# Patient Record
Sex: FEMALE | Employment: PART TIME | URBAN - NONMETROPOLITAN AREA
[De-identification: names, ages, dates, MRNs, and addresses within clinical notes are randomized per-mention and may not be internally consistent; named-entity substitution may affect disease eponyms.]

---

## 2024-09-28 ENCOUNTER — APPOINTMENT (OUTPATIENT)
Dept: ULTRASOUND IMAGING | Age: 34
End: 2024-09-28
Payer: COMMERCIAL

## 2024-09-28 ENCOUNTER — HOSPITAL ENCOUNTER (EMERGENCY)
Age: 34
Discharge: HOME | End: 2024-09-28
Payer: COMMERCIAL

## 2024-09-28 VITALS
DIASTOLIC BLOOD PRESSURE: 76 MMHG | RESPIRATION RATE: 16 BRPM | SYSTOLIC BLOOD PRESSURE: 102 MMHG | TEMPERATURE: 98.7 F | HEIGHT: 67 IN | BODY MASS INDEX: 20.4 KG/M2 | OXYGEN SATURATION: 99 % | HEART RATE: 77 BPM | WEIGHT: 130 LBS

## 2024-09-28 DIAGNOSIS — O20.9 VAGINAL BLEEDING BEFORE 22 WEEKS GESTATION: Primary | ICD-10-CM

## 2024-09-28 LAB
ANION GAP SERPL CALC-SCNC: 14 MEQ/L (ref 8–16)
BACTERIA URNS QL MICRO: ABNORMAL /HPF
BASOPHILS ABSOLUTE: 0 THOU/MM3 (ref 0–0.1)
BASOPHILS NFR BLD AUTO: 0.4 %
BILIRUB UR QL STRIP.AUTO: NEGATIVE
BUN SERPL-MCNC: 9 MG/DL (ref 7–22)
CALCIUM SERPL-MCNC: 8.5 MG/DL (ref 8.5–10.5)
CASTS #/AREA URNS LPF: ABNORMAL /LPF
CASTS 2: ABNORMAL /LPF
CHARACTER UR: ABNORMAL
CHLORIDE SERPL-SCNC: 104 MEQ/L (ref 98–111)
CO2 SERPL-SCNC: 19 MEQ/L (ref 23–33)
COLOR, UA: YELLOW
CREAT SERPL-MCNC: 0.5 MG/DL (ref 0.4–1.2)
CRYSTALS URNS MICRO: ABNORMAL
DEPRECATED RDW RBC AUTO: 40.1 FL (ref 35–45)
EOSINOPHIL NFR BLD AUTO: 1.1 %
EOSINOPHILS ABSOLUTE: 0.1 THOU/MM3 (ref 0–0.4)
EPITHELIAL CELLS, UA: ABNORMAL /HPF
ERYTHROCYTE [DISTWIDTH] IN BLOOD BY AUTOMATED COUNT: 12.2 % (ref 11.5–14.5)
GFR SERPL CREATININE-BSD FRML MDRD: > 90 ML/MIN/1.73M2
GLUCOSE SERPL-MCNC: 90 MG/DL (ref 70–108)
GLUCOSE UR QL STRIP.AUTO: NEGATIVE MG/DL
HCG INTACT+B SERPL-ACNC: ABNORMAL MIU/ML (ref 0–5)
HCT VFR BLD AUTO: 37.4 % (ref 37–47)
HGB BLD-MCNC: 12.8 GM/DL (ref 12–16)
HGB UR QL STRIP.AUTO: ABNORMAL
IMM GRANULOCYTES # BLD AUTO: 0.03 THOU/MM3 (ref 0–0.07)
IMM GRANULOCYTES NFR BLD AUTO: 0.4 %
KETONES UR QL STRIP.AUTO: NEGATIVE
LYMPHOCYTES ABSOLUTE: 2.3 THOU/MM3 (ref 1–4.8)
LYMPHOCYTES NFR BLD AUTO: 28.3 %
MCH RBC QN AUTO: 31.1 PG (ref 26–33)
MCHC RBC AUTO-ENTMCNC: 34.2 GM/DL (ref 32.2–35.5)
MCV RBC AUTO: 91 FL (ref 81–99)
MISCELLANEOUS 2: ABNORMAL
MONOCYTES ABSOLUTE: 0.5 THOU/MM3 (ref 0.4–1.3)
MONOCYTES NFR BLD AUTO: 6.4 %
NEUTROPHILS ABSOLUTE: 5.1 THOU/MM3 (ref 1.8–7.7)
NEUTROPHILS NFR BLD AUTO: 63.4 %
NITRITE UR QL STRIP: NEGATIVE
NRBC BLD AUTO-RTO: 0 /100 WBC
OSMOLALITY SERPL CALC.SUM OF ELEC: 272 MOSMOL/KG (ref 275–300)
PH UR STRIP.AUTO: 8.5 [PH] (ref 5–9)
PLATELET # BLD AUTO: 205 THOU/MM3 (ref 130–400)
PMV BLD AUTO: 9.7 FL (ref 9.4–12.4)
POTASSIUM SERPL-SCNC: 3.6 MEQ/L (ref 3.5–5.2)
PROT UR STRIP.AUTO-MCNC: NEGATIVE MG/DL
RBC # BLD AUTO: 4.11 MILL/MM3 (ref 4.2–5.4)
RBC URINE: ABNORMAL /HPF
RENAL EPI CELLS #/AREA URNS HPF: ABNORMAL /[HPF]
SODIUM SERPL-SCNC: 137 MEQ/L (ref 135–145)
SP GR UR REFRACT.AUTO: 1.02 (ref 1–1.03)
UROBILINOGEN, URINE: 1 EU/DL (ref 0–1)
WBC # BLD AUTO: 8 THOU/MM3 (ref 4.8–10.8)
WBC #/AREA URNS HPF: ABNORMAL /HPF
WBC #/AREA URNS HPF: NEGATIVE /[HPF]
YEAST LIKE FUNGI URNS QL MICRO: ABNORMAL

## 2024-09-28 PROCEDURE — 76815 OB US LIMITED FETUS(S): CPT

## 2024-09-28 PROCEDURE — 80048 BASIC METABOLIC PNL TOTAL CA: CPT

## 2024-09-28 PROCEDURE — 81001 URINALYSIS AUTO W/SCOPE: CPT

## 2024-09-28 PROCEDURE — 85025 COMPLETE CBC W/AUTO DIFF WBC: CPT

## 2024-09-28 PROCEDURE — 36415 COLL VENOUS BLD VENIPUNCTURE: CPT

## 2024-09-28 PROCEDURE — 84702 CHORIONIC GONADOTROPIN TEST: CPT

## 2024-09-28 PROCEDURE — 99284 EMERGENCY DEPT VISIT MOD MDM: CPT

## 2024-09-28 ASSESSMENT — PAIN - FUNCTIONAL ASSESSMENT
PAIN_FUNCTIONAL_ASSESSMENT: 0-10
PAIN_FUNCTIONAL_ASSESSMENT: NONE - DENIES PAIN
PAIN_FUNCTIONAL_ASSESSMENT: NONE - DENIES PAIN

## 2024-09-28 ASSESSMENT — PAIN DESCRIPTION - DESCRIPTORS: DESCRIPTORS: CRAMPING

## 2024-09-28 ASSESSMENT — PAIN DESCRIPTION - ORIENTATION: ORIENTATION: LOWER

## 2024-09-28 ASSESSMENT — PAIN DESCRIPTION - LOCATION: LOCATION: ABDOMEN

## 2024-09-28 ASSESSMENT — PAIN SCALES - GENERAL: PAINLEVEL_OUTOF10: 2

## 2024-09-29 NOTE — ED TRIAGE NOTES
Pt presents to the ED via walk-in from home with c/o vaginal bleeding that started early afternoon and was scant spotting with brown looking blood and then as the day went on blood got brighter and about an hour ago blood became more with some possible clots present pt states. Pt states she has mild cramping in lower abdomen rated 2/10 intermittently. Pt is 15 weeks pregnant, pt states at 8 weeks pregnant she had bleeding and was diagnosed with 2 bleeds in uterus, pt states \"I am hoping it is something to do with that\".  Pt VSS and A&O x4.

## 2024-09-29 NOTE — ED NOTES
Pt is sitting up in bed, family at bedside, provider at bedside talking with pt. Pt is breathing regular and unlabored on room air. Pt has lower abdominal cramping pt states \"it's not bad\", no other signs of distress to note at this time. Call light within reach.

## 2024-09-29 NOTE — ED PROVIDER NOTES
[facility] EMERGENCY DEPT      [facility] Emergency Department Encounter    Pt Name: Jade Zuñiga  MRN: [id number]  Birthdate 1990  Date of evaluation: 9/28/2024    PA: Bentley SANCHES-EM      CHIEF COMPLAINT    Chief Complaint   Patient presents with    Vaginal Bleeding             HISTORY OF PRESENT ILLNESS       Jade Zuñiga is a 33 y.o. female who presents to the emergency dept  with vaginal bleeding.  Patient is G2, P1 ; ZGSD=4781 Last pregnancy was normal with no complications. She has a 2 year old daughter at home.  At approximately 7 weeks ago she started having vaginal spotting without cramping and she was diagnosed with a subchorionic hemorrhage at that point.  She stated she had some light bleeding at that time and did not use any pads and the bleeding resolved shortly after. The course of her pregnancy has otherwise been uneventful.  Today she started having low abdominal cramping similar to menstrual cramps in her low abdomen that comes and goes.  She describes it as a crampy nature.  She states bleeding started again and this time it is more reddish-brown.  She did wear a pad and changed once but felt there was a lot of bleeding this morning when she went to the restroom and that alarmed her to come to the ED.  She denies have any other vaginal symptoms.  She denies recent intercourse that may have triggered symptoms.   Other than the subchorionic hemorrhage she has had a normal pregnancy.  She is having normal first trimester nausea which resolves easily and typical in pregnancy.  She denies any lightheadedness or dizziness.  No chest pain or shortness of breath.  She does have an appointment with her OB Dr. Nuñez On Tuesday. She has no other concerns at this time.         PAST MEDICAL HISTORY   has no past medical history on

## 2024-09-29 NOTE — ED NOTES
Provider did pelvic exam with this RN at bedside. Pt laying down in bed with mom at bedside. Pt is breathing regular and unlabored on room air. Pt has no signs of distress to note at this time. Call light within reach.

## 2024-09-29 NOTE — ED NOTES
Pt is sitting up in bed, mom at bedside asks for water. Pt is breathing regular and unlabored on room air. Pt has no signs of distress to note at this time. Call light within reach.

## 2025-03-02 ENCOUNTER — ANESTHESIA (OUTPATIENT)
Dept: LABOR AND DELIVERY | Age: 35
End: 2025-03-02
Payer: COMMERCIAL

## 2025-03-02 ENCOUNTER — ANESTHESIA EVENT (OUTPATIENT)
Dept: LABOR AND DELIVERY | Age: 35
End: 2025-03-02
Payer: COMMERCIAL

## 2025-03-02 ENCOUNTER — HOSPITAL ENCOUNTER (INPATIENT)
Age: 35
LOS: 2 days | Discharge: HOME | End: 2025-03-04
Attending: OBSTETRICS & GYNECOLOGY | Admitting: OBSTETRICS & GYNECOLOGY
Payer: COMMERCIAL

## 2025-03-02 PROBLEM — O47.9 UTERINE CONTRACTIONS DURING PREGNANCY: Status: ACTIVE | Noted: 2025-03-02

## 2025-03-02 LAB
ABO GROUP BLD: NORMAL
ALBUMIN SERPL BCG-MCNC: 3.6 G/DL (ref 3.4–4.9)
ALP SERPL-CCNC: 145 U/L (ref 35–104)
ALT SERPL W/O P-5'-P-CCNC: 9 U/L (ref 10–35)
AMPHETAMINES UR QL SCN: NEGATIVE
ANION GAP SERPL CALC-SCNC: 21 MEQ/L (ref 8–16)
APTT PPP: 24.8 SECONDS (ref 22–38)
AST SERPL-CCNC: 25 U/L (ref 10–35)
BACTERIA URNS QL MICRO: ABNORMAL /HPF
BARBITURATES UR QL SCN: NEGATIVE
BENZODIAZ UR QL SCN: NEGATIVE
BILIRUB SERPL-MCNC: 0.6 MG/DL (ref 0.3–1.2)
BILIRUB UR QL STRIP.AUTO: ABNORMAL
BUN SERPL-MCNC: 21 MG/DL (ref 8–23)
BZE UR QL SCN: NEGATIVE
CALCIUM SERPL-MCNC: 9.2 MG/DL (ref 8.6–10)
CANNABINOIDS UR QL SCN: NEGATIVE
CASTS #/AREA URNS LPF: ABNORMAL /LPF
CASTS 2: ABNORMAL /LPF
CHARACTER UR: ABNORMAL
CHLORIDE SERPL-SCNC: 99 MEQ/L (ref 98–111)
CO2 SERPL-SCNC: 17 MEQ/L (ref 22–29)
COLOR, UA: ABNORMAL
CREAT SERPL-MCNC: 0.6 MG/DL (ref 0.5–0.9)
CRYSTALS URNS MICRO: ABNORMAL
DEPRECATED RDW RBC AUTO: 44.6 FL (ref 35–45)
EPITHELIAL CELLS, UA: ABNORMAL /HPF
ERYTHROCYTE [DISTWIDTH] IN BLOOD BY AUTOMATED COUNT: 13.3 % (ref 11.5–14.5)
FENTANYL: NEGATIVE
FIBRINOGEN PPP-MCNC: 402 MG/100ML (ref 155–475)
GFR SERPL CREATININE-BSD FRML MDRD: > 90 ML/MIN/1.73M2
GLUCOSE SERPL-MCNC: 110 MG/DL (ref 74–109)
GLUCOSE UR QL STRIP.AUTO: NEGATIVE MG/DL
HCT VFR BLD AUTO: 37.9 % (ref 37–47)
HGB BLD-MCNC: 12.7 GM/DL (ref 12–16)
HGB UR QL STRIP.AUTO: NEGATIVE
IAT IGG-SP REAG SERPL QL: NORMAL
INR PPP: 0.97 (ref 0.85–1.13)
KETONES UR QL STRIP.AUTO: >= 160
MCH RBC QN AUTO: 30.8 PG (ref 26–33)
MCHC RBC AUTO-ENTMCNC: 33.5 GM/DL (ref 32.2–35.5)
MCV RBC AUTO: 92 FL (ref 81–99)
MISCELLANEOUS 2: ABNORMAL
NITRITE UR QL STRIP: NEGATIVE
OPIATES UR QL SCN: NEGATIVE
OXYCODONE: NEGATIVE
PCP UR QL SCN: NEGATIVE
PH UR STRIP.AUTO: 5.5 [PH] (ref 5–9)
PLATELET # BLD AUTO: 195 THOU/MM3 (ref 130–400)
PMV BLD AUTO: 10.5 FL (ref 9.4–12.4)
POTASSIUM SERPL-SCNC: 3.2 MEQ/L (ref 3.5–5.2)
PROT SERPL-MCNC: 6.9 G/DL (ref 6.4–8.3)
PROT UR STRIP.AUTO-MCNC: 30 MG/DL
RBC # BLD AUTO: 4.12 MILL/MM3 (ref 4.2–5.4)
RBC URINE: ABNORMAL /HPF
RENAL EPI CELLS #/AREA URNS HPF: ABNORMAL /[HPF]
RH BLD: NORMAL
SODIUM SERPL-SCNC: 137 MEQ/L (ref 135–145)
SP GR UR REFRACT.AUTO: > 1.03 (ref 1–1.03)
UROBILINOGEN, URINE: 1 EU/DL (ref 0–1)
WBC # BLD AUTO: 10.2 THOU/MM3 (ref 4.8–10.8)
WBC #/AREA URNS HPF: ABNORMAL /HPF
WBC #/AREA URNS HPF: NEGATIVE /[HPF]
YEAST LIKE FUNGI URNS QL MICRO: ABNORMAL

## 2025-03-02 PROCEDURE — 2500000003 HC RX 250 WO HCPCS: Performed by: OBSTETRICS & GYNECOLOGY

## 2025-03-02 PROCEDURE — 81001 URINALYSIS AUTO W/SCOPE: CPT

## 2025-03-02 PROCEDURE — 80307 DRUG TEST PRSMV CHEM ANLYZR: CPT

## 2025-03-02 PROCEDURE — 2580000003 HC RX 258: Performed by: OBSTETRICS & GYNECOLOGY

## 2025-03-02 PROCEDURE — 85384 FIBRINOGEN ACTIVITY: CPT

## 2025-03-02 PROCEDURE — 86901 BLOOD TYPING SEROLOGIC RH(D): CPT

## 2025-03-02 PROCEDURE — 86885 COOMBS TEST INDIRECT QUAL: CPT

## 2025-03-02 PROCEDURE — 85730 THROMBOPLASTIN TIME PARTIAL: CPT

## 2025-03-02 PROCEDURE — 86900 BLOOD TYPING SEROLOGIC ABO: CPT

## 2025-03-02 PROCEDURE — 3700000025 EPIDURAL BLOCK: Performed by: ANESTHESIOLOGY

## 2025-03-02 PROCEDURE — 87086 URINE CULTURE/COLONY COUNT: CPT

## 2025-03-02 PROCEDURE — 6370000000 HC RX 637 (ALT 250 FOR IP): Performed by: OBSTETRICS & GYNECOLOGY

## 2025-03-02 PROCEDURE — 85610 PROTHROMBIN TIME: CPT

## 2025-03-02 PROCEDURE — 86592 SYPHILIS TEST NON-TREP QUAL: CPT

## 2025-03-02 PROCEDURE — 36415 COLL VENOUS BLD VENIPUNCTURE: CPT

## 2025-03-02 PROCEDURE — 80053 COMPREHEN METABOLIC PANEL: CPT

## 2025-03-02 PROCEDURE — 2500000003 HC RX 250 WO HCPCS: Performed by: ANESTHESIOLOGY

## 2025-03-02 PROCEDURE — 1220000001 HC SEMI PRIVATE L&D R&B

## 2025-03-02 PROCEDURE — 85027 COMPLETE CBC AUTOMATED: CPT

## 2025-03-02 PROCEDURE — 6360000002 HC RX W HCPCS: Performed by: OBSTETRICS & GYNECOLOGY

## 2025-03-02 RX ORDER — LIDOCAINE HYDROCHLORIDE 10 MG/ML
30 INJECTION, SOLUTION EPIDURAL; INFILTRATION; INTRACAUDAL; PERINEURAL PRN
Status: DISCONTINUED | OUTPATIENT
Start: 2025-03-02 | End: 2025-03-03

## 2025-03-02 RX ORDER — SODIUM CHLORIDE 0.9 % (FLUSH) 0.9 %
5-40 SYRINGE (ML) INJECTION PRN
Status: DISCONTINUED | OUTPATIENT
Start: 2025-03-02 | End: 2025-03-03

## 2025-03-02 RX ORDER — TRANEXAMIC ACID 10 MG/ML
1000 INJECTION, SOLUTION INTRAVENOUS
Status: DISCONTINUED | OUTPATIENT
Start: 2025-03-02 | End: 2025-03-03

## 2025-03-02 RX ORDER — ONDANSETRON 4 MG/1
4 TABLET, ORALLY DISINTEGRATING ORAL EVERY 8 HOURS PRN
Status: DISCONTINUED | OUTPATIENT
Start: 2025-03-02 | End: 2025-03-02 | Stop reason: SDUPTHER

## 2025-03-02 RX ORDER — SODIUM CHLORIDE, SODIUM LACTATE, POTASSIUM CHLORIDE, CALCIUM CHLORIDE 600; 310; 30; 20 MG/100ML; MG/100ML; MG/100ML; MG/100ML
INJECTION, SOLUTION INTRAVENOUS CONTINUOUS
Status: DISCONTINUED | OUTPATIENT
Start: 2025-03-02 | End: 2025-03-02 | Stop reason: SDUPTHER

## 2025-03-02 RX ORDER — CARBOPROST TROMETHAMINE 250 UG/ML
250 INJECTION, SOLUTION INTRAMUSCULAR PRN
Status: DISCONTINUED | OUTPATIENT
Start: 2025-03-02 | End: 2025-03-03

## 2025-03-02 RX ORDER — EPHEDRINE SULFATE 5 MG/ML
10 INJECTION INTRAVENOUS
Status: COMPLETED | OUTPATIENT
Start: 2025-03-02 | End: 2025-03-03

## 2025-03-02 RX ORDER — ONDANSETRON 2 MG/ML
4 INJECTION INTRAMUSCULAR; INTRAVENOUS EVERY 6 HOURS PRN
Status: DISCONTINUED | OUTPATIENT
Start: 2025-03-02 | End: 2025-03-03

## 2025-03-02 RX ORDER — ACETAMINOPHEN 500 MG
1000 TABLET ORAL EVERY 8 HOURS SCHEDULED
Status: DISCONTINUED | OUTPATIENT
Start: 2025-03-02 | End: 2025-03-03

## 2025-03-02 RX ORDER — POTASSIUM CHLORIDE 1500 MG/1
40 TABLET, EXTENDED RELEASE ORAL PRN
Status: DISCONTINUED | OUTPATIENT
Start: 2025-03-02 | End: 2025-03-03

## 2025-03-02 RX ORDER — SODIUM CHLORIDE 0.9 % (FLUSH) 0.9 %
5-40 SYRINGE (ML) INJECTION EVERY 12 HOURS SCHEDULED
Status: DISCONTINUED | OUTPATIENT
Start: 2025-03-02 | End: 2025-03-03

## 2025-03-02 RX ORDER — SODIUM CHLORIDE, SODIUM LACTATE, POTASSIUM CHLORIDE, CALCIUM CHLORIDE 600; 310; 30; 20 MG/100ML; MG/100ML; MG/100ML; MG/100ML
INJECTION, SOLUTION INTRAVENOUS CONTINUOUS
Status: DISCONTINUED | OUTPATIENT
Start: 2025-03-02 | End: 2025-03-03

## 2025-03-02 RX ORDER — ONDANSETRON 4 MG/1
4 TABLET, ORALLY DISINTEGRATING ORAL EVERY 6 HOURS PRN
Status: DISCONTINUED | OUTPATIENT
Start: 2025-03-02 | End: 2025-03-03

## 2025-03-02 RX ORDER — OXYTOCIN/0.9 % SODIUM CHLORIDE 30/500 ML
87.3 PLASTIC BAG, INJECTION (ML) INTRAVENOUS CONTINUOUS PRN
Status: DISCONTINUED | OUTPATIENT
Start: 2025-03-02 | End: 2025-03-03

## 2025-03-02 RX ORDER — FENTANYL CITRATE 50 UG/ML
100 INJECTION, SOLUTION INTRAMUSCULAR; INTRAVENOUS
Status: DISCONTINUED | OUTPATIENT
Start: 2025-03-02 | End: 2025-03-03 | Stop reason: HOSPADM

## 2025-03-02 RX ORDER — OXYTOCIN/0.9 % SODIUM CHLORIDE 30/500 ML
1 PLASTIC BAG, INJECTION (ML) INTRAVENOUS CONTINUOUS
Status: DISCONTINUED | OUTPATIENT
Start: 2025-03-02 | End: 2025-03-03

## 2025-03-02 RX ORDER — ONDANSETRON 2 MG/ML
4 INJECTION INTRAMUSCULAR; INTRAVENOUS EVERY 6 HOURS PRN
Status: DISCONTINUED | OUTPATIENT
Start: 2025-03-02 | End: 2025-03-03 | Stop reason: HOSPADM

## 2025-03-02 RX ORDER — SODIUM CHLORIDE, SODIUM LACTATE, POTASSIUM CHLORIDE, AND CALCIUM CHLORIDE .6; .31; .03; .02 G/100ML; G/100ML; G/100ML; G/100ML
1000 INJECTION, SOLUTION INTRAVENOUS ONCE
Status: COMPLETED | OUTPATIENT
Start: 2025-03-02 | End: 2025-03-02

## 2025-03-02 RX ORDER — METHYLERGONOVINE MALEATE 0.2 MG/ML
200 INJECTION INTRAVENOUS PRN
Status: DISCONTINUED | OUTPATIENT
Start: 2025-03-02 | End: 2025-03-03

## 2025-03-02 RX ORDER — MISOPROSTOL 200 UG/1
400 TABLET ORAL PRN
Status: DISCONTINUED | OUTPATIENT
Start: 2025-03-02 | End: 2025-03-03

## 2025-03-02 RX ORDER — ONDANSETRON 2 MG/ML
4 INJECTION INTRAMUSCULAR; INTRAVENOUS EVERY 6 HOURS PRN
Status: DISCONTINUED | OUTPATIENT
Start: 2025-03-02 | End: 2025-03-02 | Stop reason: SDUPTHER

## 2025-03-02 RX ORDER — NALOXONE HYDROCHLORIDE 0.4 MG/ML
INJECTION, SOLUTION INTRAMUSCULAR; INTRAVENOUS; SUBCUTANEOUS PRN
Status: DISCONTINUED | OUTPATIENT
Start: 2025-03-02 | End: 2025-03-03 | Stop reason: HOSPADM

## 2025-03-02 RX ORDER — SODIUM CHLORIDE, SODIUM LACTATE, POTASSIUM CHLORIDE, AND CALCIUM CHLORIDE .6; .31; .03; .02 G/100ML; G/100ML; G/100ML; G/100ML
500 INJECTION, SOLUTION INTRAVENOUS PRN
Status: DISCONTINUED | OUTPATIENT
Start: 2025-03-02 | End: 2025-03-03

## 2025-03-02 RX ORDER — POTASSIUM CHLORIDE 7.45 MG/ML
10 INJECTION INTRAVENOUS PRN
Status: DISCONTINUED | OUTPATIENT
Start: 2025-03-02 | End: 2025-03-03

## 2025-03-02 RX ORDER — SODIUM CHLORIDE 9 MG/ML
25 INJECTION, SOLUTION INTRAVENOUS PRN
Status: DISCONTINUED | OUTPATIENT
Start: 2025-03-02 | End: 2025-03-03

## 2025-03-02 RX ORDER — EPHEDRINE SULFATE 5 MG/ML
5 INJECTION INTRAVENOUS PRN
Status: COMPLETED | OUTPATIENT
Start: 2025-03-03 | End: 2025-03-03

## 2025-03-02 RX ORDER — TERBUTALINE SULFATE 1 MG/ML
0.25 INJECTION SUBCUTANEOUS
Status: DISCONTINUED | OUTPATIENT
Start: 2025-03-02 | End: 2025-03-03

## 2025-03-02 RX ORDER — ACETAMINOPHEN 500 MG
1000 TABLET ORAL EVERY 8 HOURS SCHEDULED
Status: DISCONTINUED | OUTPATIENT
Start: 2025-03-02 | End: 2025-03-02

## 2025-03-02 RX ORDER — DOCUSATE SODIUM 100 MG/1
100 CAPSULE, LIQUID FILLED ORAL 2 TIMES DAILY
Status: DISCONTINUED | OUTPATIENT
Start: 2025-03-02 | End: 2025-03-03 | Stop reason: HOSPADM

## 2025-03-02 RX ADMIN — Medication 18 ML/HR: at 23:07

## 2025-03-02 RX ADMIN — SODIUM CHLORIDE, SODIUM LACTATE, POTASSIUM CHLORIDE, AND CALCIUM CHLORIDE: .6; .31; .03; .02 INJECTION, SOLUTION INTRAVENOUS at 17:58

## 2025-03-02 RX ADMIN — Medication 1 MILLI-UNITS/MIN: at 22:34

## 2025-03-02 RX ADMIN — SODIUM CHLORIDE, SODIUM LACTATE, POTASSIUM CHLORIDE, AND CALCIUM CHLORIDE: .6; .31; .03; .02 INJECTION, SOLUTION INTRAVENOUS at 23:37

## 2025-03-02 RX ADMIN — ONDANSETRON 4 MG: 4 TABLET, ORALLY DISINTEGRATING ORAL at 16:57

## 2025-03-02 RX ADMIN — ONDANSETRON 4 MG: 2 INJECTION, SOLUTION INTRAMUSCULAR; INTRAVENOUS at 16:58

## 2025-03-02 RX ADMIN — POTASSIUM CHLORIDE 40 MEQ: 1500 TABLET, EXTENDED RELEASE ORAL at 20:58

## 2025-03-02 RX ADMIN — ACETAMINOPHEN 1000 MG: 500 TABLET ORAL at 20:24

## 2025-03-02 RX ADMIN — FAMOTIDINE 20 MG: 10 INJECTION, SOLUTION INTRAVENOUS at 20:05

## 2025-03-02 RX ADMIN — SODIUM CHLORIDE, SODIUM LACTATE, POTASSIUM CHLORIDE, AND CALCIUM CHLORIDE 1000 ML: .6; .31; .03; .02 INJECTION, SOLUTION INTRAVENOUS at 16:44

## 2025-03-02 ASSESSMENT — PAIN SCALES - GENERAL: PAINLEVEL_OUTOF10: 0

## 2025-03-02 NOTE — FLOWSHEET NOTE
Dr. Samm Blood at the nurses station and updated on patient status. Patient feeling better, baby is tachycardic. RN will continue with poc, labs reviewed. Still waiting on CMP to result.

## 2025-03-02 NOTE — FLOWSHEET NOTE
Patient arrived to the unit via wheelchair from the ED with her  with c/o ctx that she thinks is due to vomiting for the last 24 hours. Patient states she can't even keep water down. Patient is having positive fetal movement, no leakage of fluid, no bleeding. Patient oriented to the room, gown provided, call light within reach.

## 2025-03-02 NOTE — FLOWSHEET NOTE
Dr. DAYDAY Nuñez updated on patient status. Patient blood pressures remain stable, patient is dumping urine. Patient has no c/o headache, nausea or vomiting. Order received for RN to discontinue magnesium and observe for 1 hour before transfer to mom/baby.

## 2025-03-03 LAB — RPR SER QL: NONREACTIVE

## 2025-03-03 PROCEDURE — 1200000000 HC SEMI PRIVATE

## 2025-03-03 PROCEDURE — 7200000001 HC VAGINAL DELIVERY

## 2025-03-03 PROCEDURE — 6370000000 HC RX 637 (ALT 250 FOR IP): Performed by: OBSTETRICS & GYNECOLOGY

## 2025-03-03 PROCEDURE — 10907ZC DRAINAGE OF AMNIOTIC FLUID, THERAPEUTIC FROM PRODUCTS OF CONCEPTION, VIA NATURAL OR ARTIFICIAL OPENING: ICD-10-PCS | Performed by: OBSTETRICS & GYNECOLOGY

## 2025-03-03 PROCEDURE — 2500000003 HC RX 250 WO HCPCS: Performed by: ANESTHESIOLOGY

## 2025-03-03 PROCEDURE — 6360000002 HC RX W HCPCS: Performed by: OBSTETRICS & GYNECOLOGY

## 2025-03-03 PROCEDURE — 2580000003 HC RX 258: Performed by: OBSTETRICS & GYNECOLOGY

## 2025-03-03 RX ORDER — METHYLERGONOVINE MALEATE 0.2 MG/ML
200 INJECTION INTRAVENOUS PRN
Status: DISCONTINUED | OUTPATIENT
Start: 2025-03-03 | End: 2025-03-04 | Stop reason: HOSPADM

## 2025-03-03 RX ORDER — FAMOTIDINE 20 MG/1
20 TABLET, FILM COATED ORAL 2 TIMES DAILY PRN
Status: DISCONTINUED | OUTPATIENT
Start: 2025-03-03 | End: 2025-03-04 | Stop reason: HOSPADM

## 2025-03-03 RX ORDER — SODIUM CHLORIDE 0.9 % (FLUSH) 0.9 %
5-40 SYRINGE (ML) INJECTION EVERY 12 HOURS SCHEDULED
Status: DISCONTINUED | OUTPATIENT
Start: 2025-03-03 | End: 2025-03-04 | Stop reason: HOSPADM

## 2025-03-03 RX ORDER — ACETAMINOPHEN 500 MG
1000 TABLET ORAL EVERY 8 HOURS SCHEDULED
Status: DISCONTINUED | OUTPATIENT
Start: 2025-03-03 | End: 2025-03-04 | Stop reason: HOSPADM

## 2025-03-03 RX ORDER — SODIUM CHLORIDE 0.9 % (FLUSH) 0.9 %
5-40 SYRINGE (ML) INJECTION PRN
Status: DISCONTINUED | OUTPATIENT
Start: 2025-03-03 | End: 2025-03-04 | Stop reason: HOSPADM

## 2025-03-03 RX ORDER — DOCUSATE SODIUM 100 MG/1
100 CAPSULE, LIQUID FILLED ORAL 2 TIMES DAILY PRN
Status: DISCONTINUED | OUTPATIENT
Start: 2025-03-03 | End: 2025-03-04 | Stop reason: HOSPADM

## 2025-03-03 RX ORDER — SODIUM CHLORIDE, SODIUM LACTATE, POTASSIUM CHLORIDE, CALCIUM CHLORIDE 600; 310; 30; 20 MG/100ML; MG/100ML; MG/100ML; MG/100ML
INJECTION, SOLUTION INTRAVENOUS CONTINUOUS
Status: DISCONTINUED | OUTPATIENT
Start: 2025-03-03 | End: 2025-03-04 | Stop reason: HOSPADM

## 2025-03-03 RX ORDER — IBUPROFEN 800 MG/1
800 TABLET, FILM COATED ORAL EVERY 8 HOURS
Status: DISCONTINUED | OUTPATIENT
Start: 2025-03-03 | End: 2025-03-04 | Stop reason: HOSPADM

## 2025-03-03 RX ORDER — OXYCODONE HYDROCHLORIDE 5 MG/1
5 TABLET ORAL EVERY 4 HOURS PRN
Status: DISCONTINUED | OUTPATIENT
Start: 2025-03-03 | End: 2025-03-04 | Stop reason: HOSPADM

## 2025-03-03 RX ORDER — MISOPROSTOL 200 UG/1
TABLET ORAL
Status: DISCONTINUED
Start: 2025-03-03 | End: 2025-03-03 | Stop reason: WASHOUT

## 2025-03-03 RX ORDER — ONDANSETRON 4 MG/1
4 TABLET, ORALLY DISINTEGRATING ORAL EVERY 6 HOURS PRN
Status: DISCONTINUED | OUTPATIENT
Start: 2025-03-03 | End: 2025-03-04 | Stop reason: HOSPADM

## 2025-03-03 RX ORDER — OXYCODONE HYDROCHLORIDE 5 MG/1
10 TABLET ORAL EVERY 4 HOURS PRN
Status: DISCONTINUED | OUTPATIENT
Start: 2025-03-03 | End: 2025-03-04 | Stop reason: HOSPADM

## 2025-03-03 RX ORDER — ONDANSETRON 2 MG/ML
4 INJECTION INTRAMUSCULAR; INTRAVENOUS EVERY 6 HOURS PRN
Status: DISCONTINUED | OUTPATIENT
Start: 2025-03-03 | End: 2025-03-04 | Stop reason: HOSPADM

## 2025-03-03 RX ORDER — MODIFIED LANOLIN
OINTMENT (GRAM) TOPICAL PRN
Status: DISCONTINUED | OUTPATIENT
Start: 2025-03-03 | End: 2025-03-04 | Stop reason: HOSPADM

## 2025-03-03 RX ORDER — FERROUS SULFATE 325(65) MG
325 TABLET ORAL
Status: DISCONTINUED | OUTPATIENT
Start: 2025-03-03 | End: 2025-03-04 | Stop reason: HOSPADM

## 2025-03-03 RX ORDER — MISOPROSTOL 200 UG/1
1000 TABLET ORAL PRN
Status: DISCONTINUED | OUTPATIENT
Start: 2025-03-03 | End: 2025-03-04 | Stop reason: HOSPADM

## 2025-03-03 RX ORDER — IBUPROFEN 800 MG/1
800 TABLET, FILM COATED ORAL ONCE
Status: COMPLETED | OUTPATIENT
Start: 2025-03-03 | End: 2025-03-03

## 2025-03-03 RX ORDER — SODIUM CHLORIDE 9 MG/ML
INJECTION, SOLUTION INTRAVENOUS PRN
Status: DISCONTINUED | OUTPATIENT
Start: 2025-03-03 | End: 2025-03-04 | Stop reason: HOSPADM

## 2025-03-03 RX ORDER — CARBOPROST TROMETHAMINE 250 UG/ML
250 INJECTION, SOLUTION INTRAMUSCULAR PRN
Status: DISCONTINUED | OUTPATIENT
Start: 2025-03-03 | End: 2025-03-04 | Stop reason: HOSPADM

## 2025-03-03 RX ADMIN — SODIUM CHLORIDE, SODIUM LACTATE, POTASSIUM CHLORIDE, AND CALCIUM CHLORIDE: .6; .31; .03; .02 INJECTION, SOLUTION INTRAVENOUS at 01:11

## 2025-03-03 RX ADMIN — Medication 166.7 ML: at 02:56

## 2025-03-03 RX ADMIN — Medication: at 05:02

## 2025-03-03 RX ADMIN — IBUPROFEN 800 MG: 800 TABLET, FILM COATED ORAL at 04:01

## 2025-03-03 RX ADMIN — ACETAMINOPHEN 1000 MG: 500 TABLET ORAL at 20:18

## 2025-03-03 RX ADMIN — DOCUSATE SODIUM 100 MG: 100 CAPSULE, LIQUID FILLED ORAL at 09:04

## 2025-03-03 RX ADMIN — EPHEDRINE SULFATE 10 MG: 5 INJECTION INTRAVENOUS at 00:27

## 2025-03-03 RX ADMIN — METHYLERGONOVINE MALEATE 200 MCG: 0.2 INJECTION, SOLUTION INTRAMUSCULAR; INTRAVENOUS at 03:00

## 2025-03-03 RX ADMIN — IBUPROFEN 800 MG: 800 TABLET, FILM COATED ORAL at 14:54

## 2025-03-03 RX ADMIN — EPHEDRINE SULFATE 5 MG: 5 INJECTION INTRAVENOUS at 00:04

## 2025-03-03 RX ADMIN — EPHEDRINE SULFATE 5 MG: 5 INJECTION INTRAVENOUS at 01:11

## 2025-03-03 ASSESSMENT — PAIN DESCRIPTION - LOCATION
LOCATION: ABDOMEN
LOCATION: ABDOMEN
LOCATION: PERINEUM

## 2025-03-03 ASSESSMENT — PAIN DESCRIPTION - DESCRIPTORS
DESCRIPTORS: CRAMPING
DESCRIPTORS: CRAMPING
DESCRIPTORS: CRAMPING;DISCOMFORT

## 2025-03-03 ASSESSMENT — PAIN SCALES - GENERAL
PAINLEVEL_OUTOF10: 4
PAINLEVEL_OUTOF10: 3
PAINLEVEL_OUTOF10: 2

## 2025-03-03 ASSESSMENT — PAIN - FUNCTIONAL ASSESSMENT: PAIN_FUNCTIONAL_ASSESSMENT: ACTIVITIES ARE NOT PREVENTED

## 2025-03-03 ASSESSMENT — PAIN DESCRIPTION - ORIENTATION
ORIENTATION: LOWER
ORIENTATION: MID;LOWER

## 2025-03-03 NOTE — LACTATION NOTE
Met with pt in room.  Gave booklet and local support information, verified they have a breast pump. Shared basics about breastfeeding, frequency, pumping tips, answered questions. Pt bf first baby for 21 months.  Offered assistance with latch as baby was very fussy and reluctant to feed.  Spoon fed drops and tired a nipple shield.  Upon observation possible short lingual frenulum noted. Explained to patient signs and symptoms of improper milk transfer. Discussed proper tongue movement and proper comfort of latch. Educated that IBCLC can not diagnose a short lingual frenulum and provided patient with physician handout for further evaluation. Name and number on board for calling out for assistance.  Offered support prn, reviewed [employer] and other area entities.

## 2025-03-03 NOTE — ANESTHESIA PROCEDURE NOTES
Epidural Block    Patient location during procedure: OB  Start time: 3/2/2025 10:57 PM  End time: 3/2/2025 11:06 PM  Reason for block: labor epidural  Staffing  Performed: resident/CRNA   Anesthesiologist: Yeyo Patino DO  Resident/CRNA: Thanh Kuhn APRN - CRNA  Performed by: Thanh Kuhn APRN - CRNA  Authorized by: Yeyo Patino DO    Epidural  Patient position: sitting  Prep: ChloraPrep  Patient monitoring: continuous pulse ox and frequent blood pressure checks  Approach: midline  Location: L3-4  Injection technique: XOCHITL saline  Provider prep: sterile gloves and mask  Needle  Needle type: Tuohy   Needle gauge: 18 G  Needle length: 3.5 in  Needle insertion depth: 5 cm  Catheter type: side hole  Catheter size: 19 G  Catheter at skin depth: 11 cm  Test dose: negativeCatheter Secured: tegaderm and tape  Assessment  Hemodynamics: stable  Attempts: 1  Outcomes: uncomplicated  Preanesthetic Checklist  Completed: patient identified, IV checked, site marked, risks and benefits discussed, surgical/procedural consents, equipment checked, pre-op evaluation, timeout performed, anesthesia consent given, oxygen available, monitors applied/VS acknowledged, fire risk safety assessment completed and verbalized and blood product R/B/A discussed and consented

## 2025-03-03 NOTE — FLOWSHEET NOTE
Up to the bathroom voided quantity sufficient. Pericare per pt. Dermaplast and tucks used, clean pad put in place. Gown changed. Pt to wheelchair and transferred to [facility] in stable condition with new born daughter in arms. Report to SHELBY Brumfield RN

## 2025-03-03 NOTE — L&D DELIVERY NOTE
Department of Obstetrics and Gynecology  Spontaneous Vaginal Delivery Note      Pt Name: Jade Patel  MRN: [id number] Acct #: [id number]  YOB: 1990  Procedure Performed By: Marva Nuñez MD, MD      Pre-operative Diagnosis:  Term pregnancy, Induced labor, and vaginal bleeding prior to  induction c/w marginal abruption  Post-operative Diagnosis: Same, delivered.  Procedure:  Spontaneous vaginal delivery  Surgeon:  Marva Nuñez    Information for the patient's :  Jorge, Addie Hansen [[id number]]        Anesthesia:  epidural anesthesia  Estimated blood loss:  300ml  Specimen:  Placenta not sent to pathology   Complications:  none  Condition:  infant stable to general nursery and mother stable    Details of Procedure:   The patient is a 34 y.o. female at 38w0d   OB History          2    Para   1    Term   1            AB        Living   1         SAB        IAB        Ectopic        Molar        Multiple        Live Births   1             who was admitted for vaginal bleeding. She received the following interventions: ARBOW and IV Pitocin augmentation.  The patient progressed well,did receive an epidural, became complete and started to push. She was placed in the dorsal lithotomy position and prepped. She delivered the vertex over an intact perineum .  The rest of the infant delivered atraumatically, placed on mother abdomen. The nurse attended the baby. The cord was clamped and cut after a minute. The baby stayed with mom and skin to skin was implemented. Routine cord blood were obtained. The placenta delivered intact, whole and that the umbilical cord had three vessels noted. The perineum and vagina were explored and a no lacerations were found . Pt had a gush of bleeding that responded with massage and methergine was given .  A vaginal sweep was performed and there were no retained products and 1 needles were taken off the field. The count was

## 2025-03-03 NOTE — ANESTHESIA POSTPROCEDURE EVALUATION
Department of Anesthesiology  Postprocedure Note    Patient: Jade Patel  MRN: [id number]  YOB: 1990  Date of evaluation: 3/3/2025    Procedure Summary       Date: 03/02/25 Room / Location:     Anesthesia Start: 2257 Anesthesia Stop: 03/03/25 0248    Procedure: Labor Analgesia Diagnosis:     Scheduled Providers:  Responsible Provider: Yeyo Patino DO    Anesthesia Type: epidural ASA Status: 2            Anesthesia Type: No value filed.    Ceci Phase I: Ceci Score: 9    Ceci Phase II: Ceci Score: 10    Anesthesia Post Evaluation    Patient location during evaluation: floor  Patient participation: complete - patient participated  Level of consciousness: awake and alert  Airway patency: patent  Nausea & Vomiting: no nausea and no vomiting  Cardiovascular status: hemodynamically stable  Respiratory status: acceptable and spontaneous ventilation  Hydration status: euvolemic  Pain management: satisfactory to patient        No notable events documented.

## 2025-03-03 NOTE — ANESTHESIA PRE PROCEDURE
Results   Component Value Date    LABANTI NEG 03/02/2025       Drug/Infectious Status (If Applicable):  No results found for: \"HIV\", \"HEPCAB\"    COVID-19 Screening (If Applicable): No results found for: \"COVID19\"        Anesthesia Evaluation  Patient summary reviewed  Airway: Mallampati: III  TM distance: >3 FB   Neck ROM: full  Mouth opening: > = 3 FB   Dental:          Pulmonary:                              Cardiovascular:                      Neuro/Psych:               GI/Hepatic/Renal:             Endo/Other:                     Abdominal:   (+) obese          Vascular:          Other Findings:             Anesthesia Plan      epidural     ASA 2             Anesthetic plan and risks discussed with patient.      Plan discussed with attending.                    GABY Song - CRNA   3/2/2025

## 2025-03-03 NOTE — FLOWSHEET NOTE
Patient is sitting upright in bed holding baby and bonding well. Mother is calm and cooperative with visitors present. VS WDL and pt reports no pain at this time. Pt expressed trouble latching, lactation consulted earlier today. Fundus is firm and U/-1. Scant amount of rubra lochia noted on eli-pad. No signs of lacerations, swelling, or infection noted. Skin is pink, warm, and dry. No edema present. Homans sign negative. Mother is voiding without difficulty but has not passed gas yet. All questions answered. Call light and bedside table within reach.     SN Estella ONU

## 2025-03-03 NOTE — FLOWSHEET NOTE
Bedside report received from Veena Combs RN. Patient sitting up for epidural, coping well with contractions. Thanh Kuhn CRNA at bedside.

## 2025-03-03 NOTE — FLOWSHEET NOTE
Dr. Ruelas at bedside to discuss POC. Will proceed with induction at this time due to continues bleeding a fetal heart tone decelerations. Patient and FOB are agreeable to POC and voice no further questions or concerns at this time.

## 2025-03-03 NOTE — FLOWSHEET NOTE
Dr. Ruelas consulted with Dr. Nuñez and is discussing POC with patient at this time. Plan will be to monitor patient for continued bleeding and fetal distress. If patient continues to bleed or if induction is indicated then patient is okay with proceeding with induction. Possible partial placenta abruption discussed with patient in length. She understands associated risks and potential events including a potential delivery per  section if indicated. Patient understands and agrees with potential risks. No further questions or concerns at this time.

## 2025-03-03 NOTE — FLOWSHEET NOTE
Up to BR with assist to void large amount, Pericare instructions given, voices understanding, Fundus firm , midline, U/-2 after voiding, small amount of bleeding noted.

## 2025-03-03 NOTE — H&P
[facility]  History and Physical Update    Pt Name: Jade Patel  MRN: [id number]  YOB: 1990  Date of evaluation: 3/2/2025    [] I have examined the patient and reviewed the H&P/Consult and there are no changes to the patient or plans.    [x] I have examined the patient and reviewed the H&P/Consult and have noted the following changes:   Appreciate Dr Quijano's care.  Agree with assessment of possible marginal abruption.   Fhts now cat 1 after being cat 2 for tachycardia  D/w Jade Venegas and Maribell  Will augment with pitocin.      Discussion with the patient and/ or family for proposed care, treatment, services; benefits, risks, side effects; likelihood of achieving goals and potential problems that may occur during recuperation was had and all questions were answered.  Discussion with the patient and/ or family of reasonable alternatives to the proposed care, treatment, services and the discussion of the risks, benefits, side effects related to the alternatives and the risk related to not receiving the proposed care treatment services was also had and all questions were answered.    If this is for an elective surgical procedure then The patient was counseled at length about the risks of susy Covid-19 during their perioperative period and any recovery window from their procedure.  The patient was made aware that susy Covid-19  may worsen their prognosis for recovering from their procedure  and lend to a higher morbidity and/or mortality risk.  All material risks, benefits, and reasonable alternatives including postponing the procedure were discussed. The patient  does wish to proceed with the procedure at this time.             Marva Nuñez MD,MD  Electronically signed 3/2/2025 at 8:34 PM

## 2025-03-03 NOTE — L&D DELIVERY NOTE
Addie Patel [[id number]]      Labor Events     Labor: No   Steroids: None  Cervical Ripening Date/Time:      Antibiotics Received during Labor: No  Rupture Date/Time:  3/2/25 20:16:00   Rupture Type: AROM  Fluid Color: Bloody Show  Fluid Odor: None  Fluid Volume: Scant  Induction: None  Augmentation: AROM, Oxytocin       Anesthesia    Method: Epidural       Labor Event Times      Labor onset date/time:        Dilation complete date/time:  3/3/25 01:30:00 EST     Start pushing date/time:     Decision date/time (emergent ):            Delivery Details      Delivery Date: 3/3/25 Delivery Time: 02:48:00                 Cord    Vessels: 3 Vessels  Complications: None  Delayed Cord Clamping?: Yes  Cord Clamped Date/Time: 3/3/2025 02:52:00  Cord Blood Disposition: Lab  Gases Sent?: No              Placenta           Lacerations           Vaginal Counts    Initial Count Personnel: LIAM ARZOLA RN  Initial Count Verified By: 16 INSTRUMENTS,5 LAPS,1 SHARP  Intial Sponge Count: Correct Intial Needles Count: Correct Intial Instruments Count: Correct          Blood Loss  Mother: Jade Patel #[id number]     Start of Mother's Information      Delivery Blood Loss   Intrapartum & Postpartum: 25 1448 - 25 0309    Delivery Admission: 25 1448 - 25 0309         Intrapartum & Postpartum Delivery Admission    Quantitative Blood Loss (mL) Hospital Encounter 17 grams 17 grams    Total  17 mL 17 mL               End of Mother's Information  Mother: Jade Patel #[id number]                Delivery Providers    Delivering clinician:      Provider Role     Obstetrician     Primary Nurse     Primary  Nurse     NICU Nurse     Neonatologist     Anesthesiologist     Nurse Anesthetist     Nurse Practitioner     Midwife     Nursery Nurse     Respiratory Therapist     Scrub Tech     Assistant Surgeon              [place] Assessment    Living Status: Living        Skin Color:

## 2025-03-03 NOTE — FLOWSHEET NOTE
Dr. Samm Blood notified patient had some vaginal bleeding and has been asked to come and evaluate.

## 2025-03-03 NOTE — FLOWSHEET NOTE
Dr. DAYDAY Nuñez returned page. Updated on patient's contraction pattern and she has viewed strip. Dr. Nuñez states it is okay to initiate pitocin at this time. No further questions or concerns. Will continue to work towards vaginal delivery.

## 2025-03-03 NOTE — H&P
Department of Obstetrics and Gynecology   Obstetrics History and Physical        CHIEF COMPLAINT:  vaginal bleeding    HISTORY OF PRESENT ILLNESS:      The patient is a 34 y.o. female at 37w6d.  She has been here approx 4.5 hours this afternoon for likely viral gastroenteritis; however during her stay she started having mod-large amount vaginal maroon bleeding c/w marginal abruption.  Subtle late decels x 2 noted at 1940 and 1949.  We plan to keep her and induce her labor now that FHT are up to 170 and bleeding continues.    OB History          2    Para   1    Term   1            AB        Living   1         SAB        IAB        Ectopic        Molar        Multiple        Live Births   1            Patient presents with a chief complaint as above and is being admitted for induction    Estimated Due Date: Estimated Date of Delivery: 3/17/25    PRENATAL CARE:    Complicated by: none    PAST OB HISTORY:  OB History          2    Para   1    Term   1            AB        Living   1         SAB        IAB        Ectopic        Molar        Multiple        Live Births   1                Past Medical History:    History reviewed. No pertinent past medical history.  Past Surgical History:    History reviewed. No pertinent surgical history.  Allergies:  Patient has no known allergies.    Social History:    Social History     Socioeconomic History    Marital status:      Spouse name: Not on file    Number of children: Not on file    Years of education: Not on file    Highest education level: Not on file   Occupational History    Not on file   Tobacco Use    Smoking status: Never    Smokeless tobacco: Never   Substance and Sexual Activity    Alcohol use: Not Currently    Drug use: Never    Sexual activity: Not on file   Other Topics Concern    Not on file   Social History Narrative    Not on file     Social Determinants of Health     Financial Resource Strain: Not on file   Food Insecurity:

## 2025-03-03 NOTE — FLOWSHEET NOTE
Thanh CAVAZOS at bedside to place epidural. Consent form signed. No further questions or concerns at this time.

## 2025-03-03 NOTE — FLOWSHEET NOTE
Report received from Denice HAMPTON. Pt brought to 5a18 via wheelchair with infant in arms. Pt oriented to unit and room. Pt was fully assessed and stable. Pt has no further questions at this time.

## 2025-03-04 VITALS
HEIGHT: 67 IN | DIASTOLIC BLOOD PRESSURE: 74 MMHG | BODY MASS INDEX: 24.33 KG/M2 | HEART RATE: 87 BPM | WEIGHT: 155 LBS | SYSTOLIC BLOOD PRESSURE: 110 MMHG | RESPIRATION RATE: 16 BRPM | TEMPERATURE: 97.6 F | OXYGEN SATURATION: 97 %

## 2025-03-04 LAB
BACTERIA UR CULT: ABNORMAL
ORGANISM: ABNORMAL

## 2025-03-04 PROCEDURE — 6370000000 HC RX 637 (ALT 250 FOR IP): Performed by: OBSTETRICS & GYNECOLOGY

## 2025-03-04 RX ORDER — IBUPROFEN 800 MG/1
800 TABLET, FILM COATED ORAL EVERY 8 HOURS
Qty: 120 TABLET | Refills: 3 | Status: SHIPPED | OUTPATIENT
Start: 2025-03-04

## 2025-03-04 RX ADMIN — IBUPROFEN 800 MG: 800 TABLET, FILM COATED ORAL at 10:27

## 2025-03-04 RX ADMIN — DOCUSATE SODIUM 100 MG: 100 CAPSULE, LIQUID FILLED ORAL at 07:54

## 2025-03-04 ASSESSMENT — PAIN DESCRIPTION - DESCRIPTORS: DESCRIPTORS: CRAMPING

## 2025-03-04 ASSESSMENT — PAIN - FUNCTIONAL ASSESSMENT: PAIN_FUNCTIONAL_ASSESSMENT: ACTIVITIES ARE NOT PREVENTED

## 2025-03-04 ASSESSMENT — PAIN DESCRIPTION - LOCATION: LOCATION: ABDOMEN

## 2025-03-04 ASSESSMENT — PAIN DESCRIPTION - ORIENTATION: ORIENTATION: LOWER

## 2025-03-04 ASSESSMENT — PAIN SCALES - GENERAL: PAINLEVEL_OUTOF10: 2

## 2025-03-04 NOTE — DISCHARGE SUMMARY
Obstetric Discharge Summary      Pt Name: Jade Patel  MRN: [id number] Acct #: [id number]  YOB: 1990        Admitting Diagnosis  IUP  OB History          2    Para   2    Term   2            AB        Living   2         SAB        IAB        Ectopic        Molar        Multiple   0    Live Births   2                Reasons for Admission on 3/2/2025  3:39 PM  Uterine contractions during pregnancy [O47.9]  Vaginal Delivery      Intrapartum Procedures  None              Postpartum/Operative Complications  None       [place] Data  Information for the patient's :  Foster, Girl Jade [[id number]]   female   Birth Weight: 3.19 kg (7 lb 0.5 oz)    Discharge Diagnosis       Discharge Information  Current Discharge Medication List        START taking these medications    Details   ibuprofen (ADVIL;MOTRIN) 800 MG tablet Take 1 tablet by mouth every 8 (eight) hours  Qty: 120 tablet, Refills: 3           CONTINUE these medications which have NOT CHANGED    Details   Prenatal Multivit-Min-Fe-FA (PRE- FORMULA PO) Take by mouth             Vaginal Delivery  Diet regular  Condition Good    Discharge to:  home  Discharge instructions placed  Follow up in 5-6 wks with OBGYN

## 2025-03-04 NOTE — DISCHARGE INSTRUCTIONS
bleeding in the urine    Cough, shortness of breath, or chest pain    Depression, suicidal thoughts, or feelings of harming your baby    Breasts that are hot, red and accompanied by fever    Any cracking or bleeding from the nipple or areola (the dark-colored area of the breast)      In case of an emergency, call 911 immediately.     Dequan Vitale DO 3/4/2025 7:13 AM    
3 (mild pain)

## 2025-03-04 NOTE — PLAN OF CARE
Problem: Pain  Goal: Verbalizes/displays adequate comfort level or baseline comfort level  Outcome: Progressing  Flowsheets (Taken 3/2/2025 2041)  Verbalizes/displays adequate comfort level or baseline comfort level:   Encourage patient to monitor pain and request assistance   Assess pain using appropriate pain scale   Implement non-pharmacological measures as appropriate and evaluate response     Problem: Vaginal Birth or  Section  Goal: Fetal and maternal status remain reassuring during the birth process  Description:  Birth OB-Pregnancy care plan goal which identifies if the fetal and maternal status remain reassuring during the birth process  Outcome: Progressing  Flowsheets (Taken 3/2/2025 2041)  Fetal and Maternal Status Remain Reassuring During the Birth Process:   Monitor vital signs   Monitor uterine activity   Monitor labor progression (Vaginal delivery)   Monitor fetal heart rate     Problem: Postpartum  Goal: Experiences normal postpartum course  Description:  Postpartum OB-Pregnancy care plan goal which identifies if the mother is experiencing a normal postpartum course  Outcome: Progressing  Flowsheets (Taken 3/2/2025 2041)  Experiences Normal Postpartum Course:   Monitor maternal vital signs   Assess uterine involution     Problem: Infection - Adult  Goal: Absence of infection at discharge  Outcome: Progressing  Flowsheets (Taken 3/2/2025 2041)  Absence of infection at discharge:   Assess and monitor for signs and symptoms of infection   Monitor lab/diagnostic results   Monitor endotracheal (as able) and nasal secretions for changes in amount and color   Administer medications as ordered     Problem: Safety - Adult  Goal: Free from fall injury  Outcome: Progressing  Flowsheets (Taken 3/2/2025 2041)  Free From Fall Injury: Instruct family/caregiver on patient safety     Problem: Discharge Planning  Goal: Discharge to home or other facility with appropriate resources  Outcome: 
  Problem: Postpartum  Goal: Experiences normal postpartum course  Description:  Postpartum OB-Pregnancy care plan goal which identifies if the mother is experiencing a normal postpartum course  3/3/2025 1540 by Jaylene Combs RN  Outcome: Progressing  Flowsheets (Taken 3/3/2025 09)  Experiences Normal Postpartum Course:   Monitor maternal vital signs   Assess uterine involution  Note: Patient is having a normal postpartum course.     Problem: Postpartum  Goal: Appropriate maternal -  bonding  Description:  Postpartum OB-Pregnancy care plan goal which identifies if the mother and  are bonding appropriately  3/3/2025 1540 by Jaylene Combs, RN  Outcome: Progressing  Note: Bonding with baby, participating in infant care.      Problem: Postpartum  Goal: Establishment of infant feeding pattern  Description:  Postpartum OB-Pregnancy care plan goal which identifies if the mother is establishing a feeding pattern with their   3/3/2025 1540 by Jaylene Combs RN  Outcome: Progressing  Flowsheets (Taken 3/3/2025 0900)  Establishment of Infant Feeding Pattern:   Assess breast/bottle feeding   Refer to lactation as needed  Note: Infant is having a normal feeding pattern.     Problem: Postpartum  Goal: Incisions, wounds, or drain sites healing without S/S of infection  3/3/2025 1540 by Jaylene Combs RN  Outcome: Progressing  Note: Patient shows no sign/symptoms of infection.     Problem: Pain  Goal: Verbalizes/displays adequate comfort level or baseline comfort level  3/3/2025 1540 by Jaylene Combs RN  Outcome: Progressing  Flowsheets (Taken 3/3/2025 0900)  Verbalizes/displays adequate comfort level or baseline comfort level:   Encourage patient to monitor pain and request assistance   Assess pain using appropriate pain scale   Administer analgesics based on type and severity of pain and evaluate response   Implement non-pharmacological measures as appropriate and evaluate 
MEMO Ferrer  Absence of infection at discharge: Assess and monitor for signs and symptoms of infection  Taken 3/2/2025 2041 by Veena Combs RN  Absence of infection at discharge:   Assess and monitor for signs and symptoms of infection   Monitor lab/diagnostic results   Monitor endotracheal (as able) and nasal secretions for changes in amount and color   Administer medications as ordered  Note: Vital signs and assessments WNL.'     Problem: Safety - Adult  Goal: Free from fall injury  Outcome: Progressing  Flowsheets  Taken 3/3/2025 0520 by Carissa Brumfield RN  Free From Fall Injury: Instruct family/caregiver on patient safety  Taken 3/2/2025 2041 by Veena Combs RN  Free From Fall Injury: Instruct family/caregiver on patient safety  Note: No falls     Problem: Discharge Planning  Goal: Discharge to home or other facility with appropriate resources  Outcome: Progressing  Flowsheets  Taken 3/3/2025 0520 by Carissa Brumfield RN  Discharge to home or other facility with appropriate resources: Identify barriers to discharge with patient and caregiver  Taken 3/2/2025 2041 by Veena Combs RN  Discharge to home or other facility with appropriate resources:   Identify barriers to discharge with patient and caregiver   Arrange for needed discharge resources and transportation as appropriate  Note: Discharge plans discussed with pt      Care plan reviewed with patient and she contributes to goal setting and voices understanding of plan of care.    
and symptoms of infection     Problem: Safety - Adult  Goal: Free from fall injury  3/3/2025 2300 by Steffany Graham, RN  Outcome: Progressing  Flowsheets (Taken 3/3/2025 2001)  Free From Fall Injury: Instruct family/caregiver on patient safety     Problem: Discharge Planning  Goal: Discharge to home or other facility with appropriate resources  3/3/2025 2300 by Stfefany Graham, RN  Outcome: Progressing  Flowsheets (Taken 3/3/2025 2001)  Discharge to home or other facility with appropriate resources: Identify barriers to discharge with patient and caregiver   Plan of care discussed with mother and she contributes to goal setting and voices understanding of plan of care.   
family/caregiver on patient safety     Problem: Discharge Planning  Goal: Discharge to home or other facility with appropriate resources  3/3/2025 0021 by Bernadette Fall, RN  Outcome: Progressing  Flowsheets (Taken 3/2/2025 2041 by Veena Combs, RN)  Discharge to home or other facility with appropriate resources:   Identify barriers to discharge with patient and caregiver   Arrange for needed discharge resources and transportation as appropriate

## 2025-03-04 NOTE — PROGRESS NOTES
Baby assessment at 2020 had no change to previous assessment.   Dennis PINA, ONU.   
Department of Obstetrics and Gynecology  Labor and Delivery   Triage Note      Pt Name: Jade Patel  MRN: [id number] Acct #: [id number]  YOB: 1990  Procedure Performed By: Elif Ruelas MD        SUBJECTIVE:     Patient is a 34 year old  at 37 weeks c/o contractions, and n/v/d; patient of Dr. Nuñez.  She reports she had abrupt onset of n/v/d last night at 7pm. She vomiting many many times through the night - too many to count; and also today - all day.  Today the contractions started, so she decided to come in. She feels \"so thirsty\", but cannot even keep water down.  She had watery diarrhea multiple times last night but it stopped mid way through the night.  She does not have any medications at home so has taken nothing for vomiting. Pregnancy so far has been uncomplicated. The contractions are strong and uncomfortable but not super frequent. She is exhausted from being up all night. Did report several episodes in the night of \"breathing fast/hyperventilating and having accompanying tingling of her fingers/toes\", but it went away.     Patient denies any decreased fetal movement, vaginal bleeding, leaking of fluid or decreased fetal movement.    Denies any headaches, visual symptoms or epigastric pain. Denies fever, chills, coughing, wheezing, chest pain, constipation, or dysuria.      Obstetrical history and records reviewed    PMH:  none    OBJECTIVE:  Vitals:  Ht 1.702 m (5' 7\")   Wt 70.3 kg (155 lb)   LMP 2024   BMI 24.28 kg/m²   General appearance: apparently uncomfortable, panting through contractions, appears stated age.  Eyes:  Conjunctivae/corneas clear.  HENT: Head normal in appearance. External nares normal.  Oral mucosa clearly DRY.  Hearing grossly intact.   Neck: Supple, with full range of motion. Trachea midline.   Respiratory:  Normal respiratory effort.     Abdomen: Soft, non-tender, non-distended with normal bowel sounds. Gravid c/w gestational 
Department of Obstetrics and Gynecology  Labor and Delivery   Triage Note      Pt Name: Jade Patel  MRN: [id number] Acct #: [id number]  YOB: 1990  Procedure Performed By: Elif Ruelas MD      SUBJECTIVE:   Patient is a 34 year old  at 37 weeks with most likely gastroenteritis - seen earlier for this - dehydrated -IVF and meds given,; feeling better.  I was just called to come to her bedside with a mod amount of NEW bright red vaginal bleeding with clots.  Earlier RN checked her cervix to r/o labor - and she has been unchanged at 3cm.  RN denied ANY vaginal bleeding during prior exams.  Over the last few hours the initial FHT baseline elevated to 150-160s but still with mod variability.    PMH:  OBJECTIVE:  Vitals:  /79   Pulse (!) 115   Temp 98.2 °F (36.8 °C)   Ht 1.702 m (5' 7\")   Wt 70.3 kg (155 lb)   LMP 2024   BMI 24.28 kg/m²   CONSTITUTIONAL:  awake, alert, cooperative, no apparent distress, and appears stated age  ABDOMEN:  soft, non-distended, non-tender, does have some still painful contractions  GENITAL/URINARY:  External Genitalia:  Abnormal findings: on Pad there is a 6-7cm area of blood soaked under her and SSE shows about egg size clot along with some blood as well. CE still unchanged at 3/90 Vertex, no placental edge palpated.    Fetal Position:  Cephalic    Membranes:  Intact    Fetal heart rate:         Baseline Heart Rate:  155        Accelerations:  present       Decelerations:  none       Variability:  moderate    Contraction frequency: irregular minutes    Labs Reviewed   COMPREHENSIVE METABOLIC PANEL - Abnormal; Notable for the following components:       Result Value    Glucose 110 (*)     Potassium 3.2 (*)     CO2 17 (*)     Alkaline Phosphatase 145 (*)     ALT 9 (*)     All other components within normal limits   URINE WITH REFLEXED MICRO - Abnormal; Notable for the following components:    Bilirubin, Urine SMALL (*)     Ketones, 
Department of Obstetrics and Gynecology  Labor and Delivery  Attending Post Partum Progress Note    PPD #2    SUBJECTIVE: Feeling well, no complaints, improved vaginal bleeding, good urine output, would like to go home today.    OBJECTIVE:     Vitals:  /66   Pulse 72   Temp 98.5 °F (36.9 °C) (Oral)   Resp 16   Ht 1.702 m (5' 7\")   Wt 70.3 kg (155 lb)   LMP 06/02/2024   SpO2 98%   Breastfeeding Unknown   BMI 24.28 kg/m²     Uterus:  normal size, well involuted, firm, non-tender    DATA:     No results found for this or any previous visit (from the past 24 hour(s)).    ASSESSMENT & PLAN:  Doing well. Plan discharge today.    Electronically signed by Dequan Vitale DO on 3/4/2025 at 7:12 AM        
In to see pt  FHTs 125 mod variability some variables  Pt Pushing well  Anticipate   Marva Nuñez MD 3/3/2025 2:27 AM     
Mother is sitting on bed holding baby and bonding well, trying to get baby to latch for 20 minutes. Mother is calm and cooperative. Vital signs are all WDL and pain was rated a 2/10 on the pain scale, she described it as mild cramping. Breasts are full, nipples are non-tender. Fundus is firm and U/-1. Small amount of rubra lochia noted on eli pad. No signs of swelling or infection noted. Skin is pink, warm, and dry. No swelling or edema noted in lower legs, ankles, and feet. Homans sign negative. Mother has not passed gas, but has urinated twice. Call light within reach. Mom did end up getting baby to latch at 1620.  H.Michael SN, ONU.   
Mother was up holding baby and bonding well. Mother is calm and cooperative. Pain rated 2/10 on the pain scale. Breasts are full and nipples are non-tender. Fundus is firm and U/-1. Small amount of rubra lochia noted on eli pad. No signs of swelling or infection noted. Skin is pink, warm, and dry. Homans sign is negative. Mother is voiding without difficulty and passing gas. All questions answered. Call light and bedside table within reach.   PATRICIA PINA ONU  
NONE SEEN    CASTS 2 0-4 FINE GRAN NONE SEEN /lpf    MISCELLANEOUS 2 NONE SEEN    Anion Gap    Collection Time: 03/02/25  4:45 PM   Result Value Ref Range    Anion Gap 21.0 (H) 8.0 - 16.0 meq/L   Glomerular Filtration Rate, Estimated    Collection Time: 03/02/25  4:45 PM   Result Value Ref Range    Est, Glom Filt Rate > 90 >60 ml/min/1.73m2   Urine Drug Screen    Collection Time: 03/02/25  4:45 PM   Result Value Ref Range    Amphetamine+Methamphetamine Urine Screen Negative NEGATIVE    Barbiturate Quant, Ur Negative NEGATIVE    Benzodiazepine Quant, Ur Negative NEGATIVE    Cannabinoid Quant, Ur Negative NEGATIVE    Cocaine Metab Quant, Ur Negative NEGATIVE    Opiates, Urine Negative NEGATIVE    Oxycodone Negative NEGATIVE    Phencyclidine Quantitative Urine Negative NEGATIVE    Fentanyl Negative NEGATIVE   TYPE AND SCREEN    Collection Time: 03/02/25  7:25 PM   Result Value Ref Range    ABO A     RH Factor POS     Antibody Screen NEG    CBC    Collection Time: 03/02/25  8:00 PM   Result Value Ref Range    WBC 10.2 4.8 - 10.8 thou/mm3    RBC 4.12 (L) 4.20 - 5.40 mill/mm3    Hemoglobin 12.7 12.0 - 16.0 gm/dl    Hematocrit 37.9 37.0 - 47.0 %    MCV 92.0 81.0 - 99.0 fL    MCH 30.8 26.0 - 33.0 pg    MCHC 33.5 32.2 - 35.5 gm/dl    RDW-CV 13.3 11.5 - 14.5 %    RDW-SD 44.6 35.0 - 45.0 fL    Platelets 195 130 - 400 thou/mm3    MPV 10.5 9.4 - 12.4 fL   Fibrinogen    Collection Time: 03/02/25  8:00 PM   Result Value Ref Range    Fibrinogen 402 155 - 475 mg/100ml   APTT    Collection Time: 03/02/25  8:00 PM   Result Value Ref Range    aPTT 24.8 22.0 - 38.0 seconds   Protime-INR    Collection Time: 03/02/25  8:00 PM   Result Value Ref Range    INR 0.97 0.85 - 1.13       ASSESSMENT & PLAN:  Doing well. Plan discharge on day 2.    Electronically signed by Dequan Vitale DO on 3/3/2025 at 7:33 AM

## 2025-03-04 NOTE — FLOWSHEET NOTE
Post birth warning signs education paper given and reviewed, teaching complete. [place] postpartum depression screening discussed with patient, instructed to contact her healthcare provider if her score is > 10. Patient voiced understanding.  Mother's blood type is A+.  Rhogam not indicated.    Discharge instructions reviewed with patient and she voiced understanding.